# Patient Record
Sex: MALE | Race: WHITE | NOT HISPANIC OR LATINO | URBAN - METROPOLITAN AREA
[De-identification: names, ages, dates, MRNs, and addresses within clinical notes are randomized per-mention and may not be internally consistent; named-entity substitution may affect disease eponyms.]

---

## 2023-03-22 ENCOUNTER — TELEPHONE (OUTPATIENT)
Dept: PEDIATRICS | Facility: CLINIC | Age: 23
End: 2023-03-22
Payer: COMMERCIAL

## 2023-03-22 NOTE — TELEPHONE ENCOUNTER
Myles was diagnosed with an ear infection at school. Ear pain remains after the antibiotics ended. He is going back to [facility] and will report back to you next week. Child is aware that you are out of the office.    [phone]

## 2023-04-06 ENCOUNTER — OFFICE VISIT (OUTPATIENT)
Dept: PEDIATRICS | Facility: CLINIC | Age: 23
End: 2023-04-06
Payer: COMMERCIAL

## 2023-04-06 VITALS — TEMPERATURE: 97.2 F | WEIGHT: 210 LBS | BODY MASS INDEX: 26.78 KG/M2

## 2023-04-06 DIAGNOSIS — J30.1 SEASONAL ALLERGIC RHINITIS DUE TO POLLEN: ICD-10-CM

## 2023-04-06 DIAGNOSIS — H69.91 DYSFUNCTION OF RIGHT EUSTACHIAN TUBE: Primary | ICD-10-CM

## 2023-04-06 PROCEDURE — 99213 OFFICE O/P EST LOW 20 MIN: CPT | Performed by: PEDIATRICS

## 2023-04-06 PROCEDURE — 1036F TOBACCO NON-USER: CPT | Performed by: PEDIATRICS

## 2023-04-06 RX ORDER — ACETAMINOPHEN 160 MG
10 TABLET,CHEWABLE ORAL DAILY
COMMUNITY

## 2023-04-06 NOTE — PROGRESS NOTES
Subjective     History was provided by  Myles .    Myles is here with the following concern:    R sided ear pain and sensation of blockage.  He was treated with Amox then Augmentin for R sided AOM aggravated by flying and has completed 2 x 7 day courses.  He is congested, does not have fever or ST.  Myles has a history of seasonal allergies    Objective     Temp 36.2 °C (97.2 °F)   Wt 95.3 kg (210 lb)   BMI 26.78 kg/m²   @physicalexam@    General:  Well-appearing, well hydrated and in no acute distress     Eyes:  Lids:  normal  Conjunctivae:  normal     ENT:  Ears:  RTM: normal yes           LTM:  normal yes  Nose:  nares clear  Mouth:  mucosa moist; no visible lesions  Throat:  OP clear yes and moist; uvula midline  Neck:  supple     Respiratory:  Respiratory rate:  normal  Air exchange:  normal   Adventitious breath sounds:  none  Accessory muscle use:  none     Heart:  Regular rate and rhythm, no murmur     GI: Normal bowel sounds, soft, non-tender, no HSM     Skin:  Warm and well-perfused and no rashes apparent     Lymphatic: No nodes larger than 1 cm palpated  No firm or fixed nodes palpated       Assessment/Plan     Myles Ortega is well-appearing, well-hydrated, in no acute distress, and afebrile at today's visit.    His clinical presentation and examination indicates the diagnosis of Eustachian tube dysfunction and congestion from recent AOM aggravated by flying    His treatment plan includes Afrin NS BID for 3 days, with Flonase BID and antihistamines as allergy season approaches    Supportive care measures and expected course of illness reviewed.    Follow up promptly for worsening or prolonged illness.    Carlitos Simmons MD MPH

## 2024-05-30 ENCOUNTER — TELEPHONE (OUTPATIENT)
Dept: PEDIATRICS | Facility: CLINIC | Age: 24
End: 2024-05-30
Payer: COMMERCIAL

## 2024-05-30 NOTE — TELEPHONE ENCOUNTER
Myles townsend message on Dr. JACKSON. Presumes he has a sinus infection. Has abx at home and wants to know if he should take them. Advised to come in for eval.

## 2024-05-31 ENCOUNTER — OFFICE VISIT (OUTPATIENT)
Dept: PEDIATRICS | Facility: CLINIC | Age: 24
End: 2024-05-31
Payer: COMMERCIAL

## 2024-05-31 VITALS — TEMPERATURE: 98.5 F | BODY MASS INDEX: 27.15 KG/M2 | WEIGHT: 212.9 LBS

## 2024-05-31 DIAGNOSIS — J01.00 ACUTE NON-RECURRENT MAXILLARY SINUSITIS: Primary | ICD-10-CM

## 2024-05-31 PROCEDURE — 99214 OFFICE O/P EST MOD 30 MIN: CPT | Performed by: PEDIATRICS

## 2024-05-31 RX ORDER — AMOXICILLIN 875 MG/1
875 TABLET, FILM COATED ORAL 2 TIMES DAILY
Qty: 28 TABLET | Refills: 0 | Status: SHIPPED | OUTPATIENT
Start: 2024-05-31 | End: 2024-06-14

## 2024-05-31 NOTE — PROGRESS NOTES
Subjective     History was provided by  Myles .    Myles is here with the following concern:    7-10 days of nasal congestion, sinus pressure and progressive thickening of nasal secretions, no fever.    Objective     Temp 36.9 °C (98.5 °F)   Wt 96.6 kg (212 lb 14.4 oz)   BMI 27.15 kg/m²       General:  well-appearing, well hydrated and in no acute distress  Audible nasal congestion   Eyes:  Lids:  normal  Conjunctivae:  normal     ENT:  Ears:  RTM: normal yes           LTM:  normal yes  Nose:  nares clear, boggy nasal turbinatesw  Mouth:  mucosa moist; no visible lesions  Throat:  OP clear yes and moist; uvula midline  Neck:  supple     Respiratory:  Respiratory rate:  normal  Air exchange:  normal   Adventitious breath sounds:  none  Accessory muscle use:  none     Heart:  Regular rate and rhythm, no murmur     GI: Normal bowel sounds, soft, non-tender, no HSM     Skin:  Warm and well-perfused and no rashes apparent     Lymphatic: No nodes larger than 1 cm palpated  No firm or fixed nodes palpated       Assessment/Plan     Myles Ortega is well-appearing, well-hydrated, in no acute distress, and afebrile at today's visit.    His clinical presentation and examination indicates the diagnosis of sinusitis    His treatment plan includes Afrin BID for 3 days, Nasonex of Flonase BID until sx resolve, Amox as prescribed.    I referred Myles to Giuliano Steven for transition of care to internal medicine    Supportive care measures and expected course of illness reviewed.    Follow up promptly for worsening or prolonged illness.    Carlitos Simmons MD MPH

## 2025-01-07 ENCOUNTER — LAB (OUTPATIENT)
Dept: LAB | Facility: LAB | Age: 25
End: 2025-01-07
Payer: COMMERCIAL

## 2025-01-07 ENCOUNTER — APPOINTMENT (OUTPATIENT)
Dept: PRIMARY CARE | Facility: CLINIC | Age: 25
End: 2025-01-07
Payer: COMMERCIAL

## 2025-01-07 VITALS
OXYGEN SATURATION: 95 % | DIASTOLIC BLOOD PRESSURE: 85 MMHG | BODY MASS INDEX: 25.09 KG/M2 | HEIGHT: 75 IN | SYSTOLIC BLOOD PRESSURE: 125 MMHG | WEIGHT: 201.8 LBS | HEART RATE: 93 BPM

## 2025-01-07 DIAGNOSIS — D75.1 RELATIVE POLYCYTHEMIA: ICD-10-CM

## 2025-01-07 DIAGNOSIS — E83.52 SERUM CALCIUM ELEVATED: ICD-10-CM

## 2025-01-07 DIAGNOSIS — Z23 IMMUNIZATION DUE: ICD-10-CM

## 2025-01-07 DIAGNOSIS — R17 ELEVATED BILIRUBIN: ICD-10-CM

## 2025-01-07 DIAGNOSIS — Z13.220 LIPID SCREENING: ICD-10-CM

## 2025-01-07 DIAGNOSIS — Z00.00 HEALTH MAINTENANCE EXAMINATION: Primary | ICD-10-CM

## 2025-01-07 DIAGNOSIS — R19.5 ELEVATED FECAL CALPROTECTIN: ICD-10-CM

## 2025-01-07 DIAGNOSIS — D75.1 POLYCYTHEMIA: ICD-10-CM

## 2025-01-07 LAB
ALBUMIN SERPL BCP-MCNC: 4.9 G/DL (ref 3.4–5)
ALP SERPL-CCNC: 76 U/L (ref 33–120)
ALT SERPL W P-5'-P-CCNC: 27 U/L (ref 10–52)
ANION GAP SERPL CALC-SCNC: 14 MMOL/L (ref 10–20)
AST SERPL W P-5'-P-CCNC: 32 U/L (ref 9–39)
BASOPHILS # BLD AUTO: 0.09 X10*3/UL (ref 0–0.1)
BASOPHILS NFR BLD AUTO: 1.2 %
BILIRUB SERPL-MCNC: 1.3 MG/DL (ref 0–1.2)
BUN SERPL-MCNC: 16 MG/DL (ref 6–23)
CALCIUM SERPL-MCNC: 10.8 MG/DL (ref 8.6–10.6)
CHLORIDE SERPL-SCNC: 103 MMOL/L (ref 98–107)
CHOLEST SERPL-MCNC: 183 MG/DL (ref 0–199)
CHOLESTEROL/HDL RATIO: 6
CO2 SERPL-SCNC: 29 MMOL/L (ref 21–32)
CREAT SERPL-MCNC: 0.91 MG/DL (ref 0.5–1.3)
EGFRCR SERPLBLD CKD-EPI 2021: >90 ML/MIN/1.73M*2
EOSINOPHIL # BLD AUTO: 0.12 X10*3/UL (ref 0–0.7)
EOSINOPHIL NFR BLD AUTO: 1.7 %
ERYTHROCYTE [DISTWIDTH] IN BLOOD BY AUTOMATED COUNT: 12.1 % (ref 11.5–14.5)
GLUCOSE SERPL-MCNC: 90 MG/DL (ref 74–99)
HCT VFR BLD AUTO: 49.7 % (ref 41–52)
HDLC SERPL-MCNC: 30.5 MG/DL
HGB BLD-MCNC: 17 G/DL (ref 13.5–17.5)
IMM GRANULOCYTES # BLD AUTO: 0.01 X10*3/UL (ref 0–0.7)
IMM GRANULOCYTES NFR BLD AUTO: 0.1 % (ref 0–0.9)
LDLC SERPL CALC-MCNC: 124 MG/DL
LYMPHOCYTES # BLD AUTO: 2.44 X10*3/UL (ref 1.2–4.8)
LYMPHOCYTES NFR BLD AUTO: 33.8 %
MCH RBC QN AUTO: 27.9 PG (ref 26–34)
MCHC RBC AUTO-ENTMCNC: 34.2 G/DL (ref 32–36)
MCV RBC AUTO: 82 FL (ref 80–100)
MONOCYTES # BLD AUTO: 0.64 X10*3/UL (ref 0.1–1)
MONOCYTES NFR BLD AUTO: 8.9 %
NEUTROPHILS # BLD AUTO: 3.91 X10*3/UL (ref 1.2–7.7)
NEUTROPHILS NFR BLD AUTO: 54.3 %
NON HDL CHOLESTEROL: 153 MG/DL (ref 0–149)
NRBC BLD-RTO: 0 /100 WBCS (ref 0–0)
PLATELET # BLD AUTO: 285 X10*3/UL (ref 150–450)
POTASSIUM SERPL-SCNC: 4.8 MMOL/L (ref 3.5–5.3)
PROT SERPL-MCNC: 7.7 G/DL (ref 6.4–8.2)
RBC # BLD AUTO: 6.1 X10*6/UL (ref 4.5–5.9)
SODIUM SERPL-SCNC: 141 MMOL/L (ref 136–145)
TRIGL SERPL-MCNC: 141 MG/DL (ref 0–114)
VLDL: 28 MG/DL (ref 0–40)
WBC # BLD AUTO: 7.2 X10*3/UL (ref 4.4–11.3)

## 2025-01-07 PROCEDURE — 80061 LIPID PANEL: CPT

## 2025-01-07 PROCEDURE — 90472 IMMUNIZATION ADMIN EACH ADD: CPT | Performed by: STUDENT IN AN ORGANIZED HEALTH CARE EDUCATION/TRAINING PROGRAM

## 2025-01-07 PROCEDURE — 90471 IMMUNIZATION ADMIN: CPT | Performed by: STUDENT IN AN ORGANIZED HEALTH CARE EDUCATION/TRAINING PROGRAM

## 2025-01-07 PROCEDURE — 3008F BODY MASS INDEX DOCD: CPT | Performed by: STUDENT IN AN ORGANIZED HEALTH CARE EDUCATION/TRAINING PROGRAM

## 2025-01-07 PROCEDURE — 90715 TDAP VACCINE 7 YRS/> IM: CPT | Performed by: STUDENT IN AN ORGANIZED HEALTH CARE EDUCATION/TRAINING PROGRAM

## 2025-01-07 PROCEDURE — 99385 PREV VISIT NEW AGE 18-39: CPT | Performed by: STUDENT IN AN ORGANIZED HEALTH CARE EDUCATION/TRAINING PROGRAM

## 2025-01-07 PROCEDURE — 81270 JAK2 GENE: CPT

## 2025-01-07 PROCEDURE — G0452 MOLECULAR PATHOLOGY INTERPR: HCPCS | Performed by: STUDENT IN AN ORGANIZED HEALTH CARE EDUCATION/TRAINING PROGRAM

## 2025-01-07 PROCEDURE — 82668 ASSAY OF ERYTHROPOIETIN: CPT

## 2025-01-07 PROCEDURE — 90656 IIV3 VACC NO PRSV 0.5 ML IM: CPT | Performed by: STUDENT IN AN ORGANIZED HEALTH CARE EDUCATION/TRAINING PROGRAM

## 2025-01-07 PROCEDURE — 82248 BILIRUBIN DIRECT: CPT

## 2025-01-07 PROCEDURE — 85025 COMPLETE CBC W/AUTO DIFF WBC: CPT

## 2025-01-07 PROCEDURE — 80053 COMPREHEN METABOLIC PANEL: CPT

## 2025-01-07 RX ORDER — FLUTICASONE PROPIONATE 50 MCG
1 SPRAY, SUSPENSION (ML) NASAL DAILY
COMMUNITY

## 2025-01-07 RX ORDER — CETIRIZINE HYDROCHLORIDE 10 MG/1
10 TABLET ORAL DAILY
COMMUNITY

## 2025-01-07 ASSESSMENT — PATIENT HEALTH QUESTIONNAIRE - PHQ9
2. FEELING DOWN, DEPRESSED OR HOPELESS: NOT AT ALL
1. LITTLE INTEREST OR PLEASURE IN DOING THINGS: NOT AT ALL
SUM OF ALL RESPONSES TO PHQ9 QUESTIONS 1 AND 2: 0

## 2025-01-07 ASSESSMENT — PAIN SCALES - GENERAL: PAINLEVEL_OUTOF10: 0-NO PAIN

## 2025-01-07 NOTE — PROGRESS NOTES
Myles Ortega is a 24 y.o. M seen in Clinic at /[facility] by Dr. Giuliano Steven on 01/07/25 for routine care, as well as for management of the following chronic medical conditions: allergic rhinitis, tinnitus. Patient presents by himself.    Acute Concerns:     #Elevated Diastolic Blood Pressure  - Recommended increased activity levels and exercise  - Continue to monitor    #Hx of Elevated Fecal Calprotectin  - No current symptoms, no recurrence of GIB, suspect self limited viral gastroenteritis  - Continue to monitor    Chronic Conditions:    #Seasonal Allergies  - Zyrtec PRN and Flonase PRN    #Recurrent Sinusitis  -  Occurs once or twice a year, feels as if it is better now that he is out of college    #Tinnitus  - Occasional once or twice a month, lasts one minute, tolerable symptoms    ROS: +Dry skin occasional    Subspecialty Medical Care: Eye doctor for glasses  Identified Adult Providers:     Past Surgical History: [place] teeth 2023    No past surgical history on file.  Surgery/Location/Date: 2022    Medications:     Current Outpatient Medications:     cetirizine (ZyrTEC) 10 mg tablet, Take 1 tablet (10 mg) by mouth once daily., Disp: , Rfl:     fluticasone (Flonase) 50 mcg/actuation nasal spray, Administer 1 spray into each nostril once daily. Shake gently. Before first use, prime pump. After use, clean tip and replace cap., Disp: , Rfl:   Pharmacy: [facility] in [facility]    Allergies:   Allergies   Allergen Reactions    Benoxinate Syncope     Reactions:   Immunizations: *has a history of syncope with shots, needs to lie  - Flu shot due, Tdap due  - COVID x 3 obtained  - Received all his childhood vaccines    Family History:   Family History   Problem Relation Name Age of Onset    Hypertension Father      Lung cancer Paternal Grandmother         Social History:   Home/Living Situation: lives with parents, feels safe  Education: college educated  Employment/Work/Vocational: works at  "[employer]  Activities: walks daily  Drug Use: None, no tobacco  Diet: tries to avoid fast food  Sexuality/Contraception/Menstrual History: not active currently  Suicide/Depression/Anxiety: None  Sleep:   7 hrs average  Takes a while to go to sleep, but otherwise no conerns    Transition Processes:  Financial/Health Insurance:  On parental insurance  Transportation: No concerns  Voting:   Legal/Guardian:   Contact Information:   Other:     Visit Vitals  /85 (BP Location: Left arm, Patient Position: Sitting, BP Cuff Size: Large adult)   Pulse 102   Ht 1.905 m (6' 3\")   Wt 91.5 kg (201 lb 12.8 oz)   SpO2 95%   BMI 25.22 kg/m²   Smoking Status Never   BSA 2.2 m²        PHYSICAL EXAM:   General: well appearing, NAD, pleasant and engaged in encounter    HEENT: NCAT, MMM  CV: RRR, no m/r/g  PULM: CTAB, non-labored respirations   ABD: soft, NT, ND, + bowel sounds   : no suprapubic or CVA tenderness   EXT: WWP, no significant edema   SKIN: no rashes noted   NEURO: A&Ox4, symmetric facies, no gross motor or sensory deficits, normal gait  PSYCH: pleasant mood, appropriate affect     Assessment/Plan    Myles Ortega is a 24 y.o. M seen in Clinic at /[facility] by Dr. Giuliano Steven on 01/07/25 for routine care, as well as for management of the following chronic medical conditions: allergic rhinitis, tinnitus. Patient presents by himself.    Acute Concerns:     #Elevated Diastolic Blood Pressure  - Recommended increased activity levels and exercise  - Continue to monitor    #Hx of Elevated Fecal Calprotectin  - No current symptoms, no recurrence of GIB, suspect self limited viral gastroenteritis  - Continue to monitor    Chronic Conditions:    #Seasonal Allergies  - Zyrtec PRN and Flonase PRN    #Recurrent Sinusitis  -  Occurs once or twice a year, feels as if it is better now that he is out of college    #Tinnitus  - Occasional once or twice a month, lasts one minute, tolerable symptoms    #Health Maintenance   - " Vision, Hearing screens: No concerns  - Counseling regarding alcohol/tobacco/drug use: Denied tobacco, vaping, no concerns  - Depression screen: Prior episode of feeling down in 2024, now significantly improved  - BMI, Lipid, A1C screening and nutritional/exercise counseling: Provided counseling, post adolescent lipid panel  - Blood Pressure: elevated diastolic BP today  - Safe Sexual Practices, STI, HIV screening: not currently active    #Transition of Care/Young Adult Care  - Health Literacy Assessment: good  - Medications: Active medications reviewed and updated  - Allergies: Reviewed and updated   - Identified Adult or Subspecialty Providers: none  - Immunizations:   - Due flu and Tdap, will administer today  *has a history of syncope with shots and blood draws, needs to lay down for these  - COVID x 3 obtained  - Received all his childhood vaccines    Return to clinic in 1 year for follow-up.     Referrals: CBC (given higher hematocrit in past), CMP, lipid panel, flu shot, Tdap    Patient seen and examined with Dr. Steven, who agrees with above.     John Gaston MD   Internal Medicine PGY-3

## 2025-01-07 NOTE — PATIENT INSTRUCTIONS
Thank you for coming in today!    Flu and Tetanus shots today     Labs in [address]    Exercise twice per week, 45 minutes per session to start    Annual visits, come back sooner if any concerns    Spot check blood pressure at home if you are able (if mom or dad has a blood pressure cuff); goal <130/80    Reach out with any questions or concerns!    Dr. MAKI Lee

## 2025-01-09 LAB — BILIRUB DIRECT SERPL-MCNC: 0.2 MG/DL (ref 0–0.3)

## 2025-01-10 LAB — EPO SERPL-ACNC: 4 MU/ML (ref 4–27)

## 2025-01-13 LAB
ELECTRONICALLY SIGNED BY: NORMAL
JAK2 V617F INTERPRETATION: NORMAL
JAK2 V617F RESULT: NOT DETECTED